# Patient Record
Sex: MALE | Race: WHITE | NOT HISPANIC OR LATINO | Employment: FULL TIME | URBAN - METROPOLITAN AREA
[De-identification: names, ages, dates, MRNs, and addresses within clinical notes are randomized per-mention and may not be internally consistent; named-entity substitution may affect disease eponyms.]

---

## 2017-05-29 ENCOUNTER — APPOINTMENT (OUTPATIENT)
Dept: CT IMAGING | Facility: HOSPITAL | Age: 22
End: 2017-05-29

## 2017-05-29 ENCOUNTER — HOSPITAL ENCOUNTER (EMERGENCY)
Facility: HOSPITAL | Age: 22
Discharge: HOME | End: 2017-05-29
Attending: EMERGENCY MEDICINE | Admitting: EMERGENCY MEDICINE

## 2017-05-29 VITALS
WEIGHT: 165 LBS | RESPIRATION RATE: 16 BRPM | OXYGEN SATURATION: 98 % | SYSTOLIC BLOOD PRESSURE: 112 MMHG | BODY MASS INDEX: 25.9 KG/M2 | HEART RATE: 79 BPM | TEMPERATURE: 98.6 F | DIASTOLIC BLOOD PRESSURE: 68 MMHG | HEIGHT: 67 IN

## 2017-05-29 DIAGNOSIS — R10.30 LOWER ABDOMINAL PAIN: Primary | ICD-10-CM

## 2017-05-29 LAB
ALBUMIN SERPL-MCNC: 4.5 G/DL (ref 3.2–4.8)
ALBUMIN/GLOB SERPL: 1.6 G/DL (ref 1.5–2.5)
ALP SERPL-CCNC: 58 U/L (ref 25–100)
ALT SERPL W P-5'-P-CCNC: 17 U/L (ref 7–40)
ANION GAP SERPL CALCULATED.3IONS-SCNC: 6 MMOL/L (ref 3–11)
AST SERPL-CCNC: 21 U/L (ref 0–33)
BASOPHILS # BLD AUTO: 0.01 10*3/MM3 (ref 0–0.2)
BASOPHILS NFR BLD AUTO: 0.2 % (ref 0–1)
BILIRUB SERPL-MCNC: 0.9 MG/DL (ref 0.3–1.2)
BUN BLD-MCNC: 13 MG/DL (ref 9–23)
BUN/CREAT SERPL: 11.8 (ref 7–25)
CALCIUM SPEC-SCNC: 10.2 MG/DL (ref 8.7–10.4)
CHLORIDE SERPL-SCNC: 102 MMOL/L (ref 99–109)
CO2 SERPL-SCNC: 30 MMOL/L (ref 20–31)
CREAT BLD-MCNC: 1.1 MG/DL (ref 0.6–1.3)
DEPRECATED RDW RBC AUTO: 41.9 FL (ref 37–54)
EOSINOPHIL # BLD AUTO: 0.07 10*3/MM3 (ref 0.1–0.3)
EOSINOPHIL NFR BLD AUTO: 1.3 % (ref 0–3)
ERYTHROCYTE [DISTWIDTH] IN BLOOD BY AUTOMATED COUNT: 12.6 % (ref 11.3–14.5)
GFR SERPL CREATININE-BSD FRML MDRD: 84 ML/MIN/1.73
GLOBULIN UR ELPH-MCNC: 2.9 GM/DL
GLUCOSE BLD-MCNC: 107 MG/DL (ref 70–100)
HCT VFR BLD AUTO: 46.1 % (ref 38.9–50.9)
HGB BLD-MCNC: 15.7 G/DL (ref 13.1–17.5)
HOLD SPECIMEN: NORMAL
HOLD SPECIMEN: NORMAL
IMM GRANULOCYTES # BLD: 0 10*3/MM3 (ref 0–0.03)
IMM GRANULOCYTES NFR BLD: 0 % (ref 0–0.6)
LIPASE SERPL-CCNC: 26 U/L (ref 6–51)
LYMPHOCYTES # BLD AUTO: 0.87 10*3/MM3 (ref 0.6–4.8)
LYMPHOCYTES NFR BLD AUTO: 15.9 % (ref 24–44)
MCH RBC QN AUTO: 30.7 PG (ref 27–31)
MCHC RBC AUTO-ENTMCNC: 34.1 G/DL (ref 32–36)
MCV RBC AUTO: 90 FL (ref 80–99)
MONOCYTES # BLD AUTO: 0.66 10*3/MM3 (ref 0–1)
MONOCYTES NFR BLD AUTO: 12 % (ref 0–12)
NEUTROPHILS # BLD AUTO: 3.87 10*3/MM3 (ref 1.5–8.3)
NEUTROPHILS NFR BLD AUTO: 70.6 % (ref 41–71)
PLATELET # BLD AUTO: 127 10*3/MM3 (ref 150–450)
PMV BLD AUTO: 12.2 FL (ref 6–12)
POTASSIUM BLD-SCNC: 3.7 MMOL/L (ref 3.5–5.5)
PROT SERPL-MCNC: 7.4 G/DL (ref 5.7–8.2)
RBC # BLD AUTO: 5.12 10*6/MM3 (ref 4.2–5.76)
SODIUM BLD-SCNC: 138 MMOL/L (ref 132–146)
WBC NRBC COR # BLD: 5.48 10*3/MM3 (ref 3.5–10.8)
WHOLE BLOOD HOLD SPECIMEN: NORMAL
WHOLE BLOOD HOLD SPECIMEN: NORMAL

## 2017-05-29 PROCEDURE — 96374 THER/PROPH/DIAG INJ IV PUSH: CPT

## 2017-05-29 PROCEDURE — 96375 TX/PRO/DX INJ NEW DRUG ADDON: CPT

## 2017-05-29 PROCEDURE — 96376 TX/PRO/DX INJ SAME DRUG ADON: CPT

## 2017-05-29 PROCEDURE — 25010000002 ONDANSETRON PER 1 MG: Performed by: EMERGENCY MEDICINE

## 2017-05-29 PROCEDURE — 83690 ASSAY OF LIPASE: CPT | Performed by: EMERGENCY MEDICINE

## 2017-05-29 PROCEDURE — 74177 CT ABD & PELVIS W/CONTRAST: CPT

## 2017-05-29 PROCEDURE — 99283 EMERGENCY DEPT VISIT LOW MDM: CPT

## 2017-05-29 PROCEDURE — 0 IOPAMIDOL 61 % SOLUTION: Performed by: EMERGENCY MEDICINE

## 2017-05-29 PROCEDURE — 25010000002 HYDROMORPHONE PER 4 MG: Performed by: EMERGENCY MEDICINE

## 2017-05-29 PROCEDURE — 85025 COMPLETE CBC W/AUTO DIFF WBC: CPT | Performed by: EMERGENCY MEDICINE

## 2017-05-29 PROCEDURE — 80053 COMPREHEN METABOLIC PANEL: CPT | Performed by: EMERGENCY MEDICINE

## 2017-05-29 RX ORDER — ONDANSETRON 2 MG/ML
4 INJECTION INTRAMUSCULAR; INTRAVENOUS ONCE
Status: COMPLETED | OUTPATIENT
Start: 2017-05-29 | End: 2017-05-29

## 2017-05-29 RX ORDER — ONDANSETRON HYDROCHLORIDE 8 MG/1
8 TABLET, FILM COATED ORAL EVERY 8 HOURS PRN
Qty: 6 TABLET | Refills: 0 | Status: SHIPPED | OUTPATIENT
Start: 2017-05-29 | End: 2017-06-19

## 2017-05-29 RX ORDER — SODIUM CHLORIDE 0.9 % (FLUSH) 0.9 %
10 SYRINGE (ML) INJECTION AS NEEDED
Status: DISCONTINUED | OUTPATIENT
Start: 2017-05-29 | End: 2017-05-29 | Stop reason: HOSPADM

## 2017-05-29 RX ORDER — HYDROCODONE BITARTRATE AND ACETAMINOPHEN 5; 325 MG/1; MG/1
1 TABLET ORAL EVERY 4 HOURS PRN
Qty: 10 TABLET | Refills: 0 | Status: SHIPPED | OUTPATIENT
Start: 2017-05-29 | End: 2017-06-19

## 2017-05-29 RX ADMIN — ONDANSETRON 4 MG: 2 INJECTION INTRAMUSCULAR; INTRAVENOUS at 11:43

## 2017-05-29 RX ADMIN — HYDROMORPHONE HYDROCHLORIDE 1 MG: 1 INJECTION, SOLUTION INTRAMUSCULAR; INTRAVENOUS; SUBCUTANEOUS at 12:46

## 2017-05-29 RX ADMIN — HYDROMORPHONE HYDROCHLORIDE 1 MG: 1 INJECTION, SOLUTION INTRAMUSCULAR; INTRAVENOUS; SUBCUTANEOUS at 11:42

## 2017-05-29 RX ADMIN — IOPAMIDOL 100 ML: 612 INJECTION, SOLUTION INTRAVENOUS at 12:07

## 2017-06-19 ENCOUNTER — OFFICE VISIT (OUTPATIENT)
Dept: INTERNAL MEDICINE | Facility: CLINIC | Age: 22
End: 2017-06-19

## 2017-06-19 VITALS
HEART RATE: 66 BPM | BODY MASS INDEX: 26.08 KG/M2 | RESPIRATION RATE: 14 BRPM | WEIGHT: 166.19 LBS | OXYGEN SATURATION: 98 % | DIASTOLIC BLOOD PRESSURE: 74 MMHG | SYSTOLIC BLOOD PRESSURE: 114 MMHG | TEMPERATURE: 97.9 F | HEIGHT: 67 IN

## 2017-06-19 DIAGNOSIS — T15.91XA EYE FOREIGN BODY, RIGHT, INITIAL ENCOUNTER: Primary | ICD-10-CM

## 2017-06-19 PROCEDURE — 99213 OFFICE O/P EST LOW 20 MIN: CPT | Performed by: NURSE PRACTITIONER

## 2017-06-19 RX ORDER — FLUTICASONE PROPIONATE 50 MCG
SPRAY, SUSPENSION (ML) NASAL 2 TIMES DAILY
COMMUNITY
Start: 2016-03-02 | End: 2020-01-16

## 2017-06-19 NOTE — PROGRESS NOTES
Chief Complaint / Reason:      Chief Complaint   Patient presents with   • Eye Problem     feels like something is in his right eye   • Ear Problem     would like right ear checked, just had a lot of wax come out.       Subjective   Patient presents today for eye and ear problem. He states that his right eye he feels like it has something in it. Denies any vision changes but his eye is very red from him rubbing his eyes. He states that he would like to have his right ear checked also because he has had a lot of wax come out of it. Denies any problems with hearing. No fever.    Foreign Body in Eye   The incident occurred 12 to 24 hours ago. The foreign body is unknown. Intake: patient states that he was just walking outside and felt like he got something in it .        History taken from: patient    PMH/FH/Social History were reviewed and updated appropriately in the electronic medical record.     Review of Systems:   Review of Systems  All other systems were reviewed and are negative.  Exceptions are noted in the subjective or above.      Objective     Vital Signs  Vitals:    06/19/17 0932   BP: 114/74   Pulse: 66   Resp: 14   Temp: 97.9 °F (36.6 °C)   SpO2: 98%       Body mass index is 26.03 kg/(m^2).    Physical Exam   Constitutional: He is oriented to person, place, and time. He appears well-developed and well-nourished.   Eyes: EOM are normal. Pupils are equal, round, and reactive to light. Foreign body present in the right eye. Right conjunctiva has a hemorrhage.       Attempted to remove foreign body with cotton tip and irrigation unsuccessful attempts x 3, referral order placed for opthalmology. Fluorescein solution used and 1 drop of Ciprodex administered in right eye.    Cardiovascular: Normal rate, regular rhythm, normal heart sounds and intact distal pulses.    Pulmonary/Chest: Effort normal and breath sounds normal.   Neurological: He is alert and oriented to person, place, and time.   Skin: Skin is  warm and dry.   Nursing note and vitals reviewed.      Medication Review:     Current Outpatient Prescriptions:   •  fluticasone (FLONASE) 50 MCG/ACT nasal spray, into each nostril 2 (Two) Times a Day., Disp: , Rfl:     Assessment/Plan   Sumit was seen today for eye problem and ear problem.    Diagnoses and all orders for this visit:    Eye foreign body, right, initial encounter  -     Ambulatory Referral to Ophthalmology  Recommend good handwashing and not rubbing eyes.   Discussed worsening signs and symptoms.   May take Motrin or Tylenol for pain or discomfort and may use cool cloth to decrease swelling around area.   Follow up RICKY Oates, NERI  06/19/2017

## 2017-06-19 NOTE — PATIENT INSTRUCTIONS
Eye Foreign Body  A foreign body is an object on or in the eye that should not be there. The object could be a speck of dirt or dust, a hair, an eyelash, a splinter, or any other object.  HOME CARE  · Take medicines only as told by your doctor. Use eye drops or ointment as told.  · If no eye patch was put on:  ¨ Keep the eye closed as much as possible.  ¨ Do not rub the eye.  ¨ Wear dark glasses in bright light.  ¨ Do not wear contact lenses until the eye feels normal, or as told by your doctor.  ¨ Wear protective eye covering when needed, especially when using high-speed tools.  · If your eye is patched:  ¨ Follow your doctor's instructions for when to remove the patch.  ¨ Do not drive or use machines while the eye patch is on. Judging distances is hard to do while wearing a patch.  · Keep all follow-up visits as told by your doctor. This is important.  GET HELP IF:   · Your pain gets worse.  · Your vision gets worse.  · You have problems with your eye patch.  · You have fluid (discharge) coming from your eye.  · You have redness and swelling around your eye.  MAKE SURE YOU:   · Understand these instructions.  · Will watch your condition.  · Will get help right away if you are not doing well or get worse.     This information is not intended to replace advice given to you by your health care provider. Make sure you discuss any questions you have with your health care provider.     Document Released: 06/07/2011 Document Revised: 01/08/2016 Document Reviewed: 05/15/2014  [employer] Patient Education ©2017 [employer]    Eye Foreign Body  A foreign body is an object on or in the eye that should not be there. The object could be a speck of dirt or dust, a hair, an eyelash, a splinter, or any other object.  HOME CARE  · Take medicines only as told by your doctor. Use eye drops or ointment as told.  · If no eye patch was put on:  ¨ Keep the eye closed as much as possible.  ¨ Do not rub the eye.  ¨ Wear dark  glasses in bright light.  ¨ Do not wear contact lenses until the eye feels normal, or as told by your doctor.  ¨ Wear protective eye covering when needed, especially when using high-speed tools.  · If your eye is patched:  ¨ Follow your doctor's instructions for when to remove the patch.  ¨ Do not drive or use machines while the eye patch is on. Judging distances is hard to do while wearing a patch.  · Keep all follow-up visits as told by your doctor. This is important.  GET HELP IF:   · Your pain gets worse.  · Your vision gets worse.  · You have problems with your eye patch.  · You have fluid (discharge) coming from your eye.  · You have redness and swelling around your eye.  MAKE SURE YOU:   · Understand these instructions.  · Will watch your condition.  · Will get help right away if you are not doing well or get worse.     This information is not intended to replace advice given to you by your health care provider. Make sure you discuss any questions you have with your health care provider.     Document Released: 06/07/2011 Document Revised: 01/08/2016 Document Reviewed: 05/15/2014  [employer] Interactive Patient Education ©2017 [employer]    Eye Foreign Body  A foreign body is an object on or in the eye that should not be there. The object could be a speck of dirt or dust, a hair, an eyelash, a splinter, or any other object.  HOME CARE  · Take medicines only as told by your doctor. Use eye drops or ointment as told.  · If no eye patch was put on:  ¨ Keep the eye closed as much as possible.  ¨ Do not rub the eye.  ¨ Wear dark glasses in bright light.  ¨ Do not wear contact lenses until the eye feels normal, or as told by your doctor.  ¨ Wear protective eye covering when needed, especially when using high-speed tools.  · If your eye is patched:  ¨ Follow your doctor's instructions for when to remove the patch.  ¨ Do not drive or use machines while the eye patch is on. Judging distances is hard to do while  wearing a patch.  · Keep all follow-up visits as told by your doctor. This is important.  GET HELP IF:   · Your pain gets worse.  · Your vision gets worse.  · You have problems with your eye patch.  · You have fluid (discharge) coming from your eye.  · You have redness and swelling around your eye.  MAKE SURE YOU:   · Understand these instructions.  · Will watch your condition.  · Will get help right away if you are not doing well or get worse.     This information is not intended to replace advice given to you by your health care provider. Make sure you discuss any questions you have with your health care provider.     Document Released: 06/07/2011 Document Revised: 01/08/2016 Document Reviewed: 05/15/2014  [employer] Interactive Patient Education ©2017 [employer]

## 2017-11-24 ENCOUNTER — HOSPITAL ENCOUNTER (EMERGENCY)
Facility: HOSPITAL | Age: 22
Discharge: STILL ADMITTED OR ADMINISTRATIVE | End: 2017-11-24

## 2020-01-16 ENCOUNTER — OFFICE VISIT (OUTPATIENT)
Dept: INTERNAL MEDICINE | Facility: CLINIC | Age: 25
End: 2020-01-16

## 2020-01-16 VITALS
BODY MASS INDEX: 28.72 KG/M2 | TEMPERATURE: 97.6 F | SYSTOLIC BLOOD PRESSURE: 104 MMHG | HEART RATE: 77 BPM | DIASTOLIC BLOOD PRESSURE: 80 MMHG | WEIGHT: 183 LBS | OXYGEN SATURATION: 98 % | RESPIRATION RATE: 16 BRPM | HEIGHT: 67 IN

## 2020-01-16 DIAGNOSIS — F41.9 ANXIETY: ICD-10-CM

## 2020-01-16 DIAGNOSIS — R40.0 DAYTIME SLEEPINESS: ICD-10-CM

## 2020-01-16 DIAGNOSIS — E66.3 OVERWEIGHT: ICD-10-CM

## 2020-01-16 DIAGNOSIS — T78.40XA ALLERGIC STATE, INITIAL ENCOUNTER: ICD-10-CM

## 2020-01-16 DIAGNOSIS — R53.83 OTHER FATIGUE: Primary | ICD-10-CM

## 2020-01-16 DIAGNOSIS — R06.83 SNORES: ICD-10-CM

## 2020-01-16 PROCEDURE — 99214 OFFICE O/P EST MOD 30 MIN: CPT | Performed by: INTERNAL MEDICINE

## 2020-01-16 NOTE — PROGRESS NOTES
Subjective   Sumit Philippe is a 24 y.o. male.     Chief Complaint   Patient presents with   • Labs Only     Pt states that he would like for his TSH to be checked, pt has been c/o being very tired all the time.        History of Present Illness   C/o of fatigue no energy, daytime sleepy,1- 2 years.  snore loud, no depression and mild anxiety. Weight gain 20 Ib over 6 months. Positive running nose   No current outpatient medications on file.    The following portions of the patient's history were reviewed and updated as appropriate: allergies, current medications, past family history, past medical history, past social history, past surgical history and problem list.    Review of Systems   Constitutional: Positive for fatigue.   HENT: Positive for postnasal drip and rhinorrhea. Negative for congestion.    Respiratory: Negative.  Negative for shortness of breath.    Cardiovascular: Negative.  Negative for chest pain.   Gastrointestinal: Negative.    Musculoskeletal: Negative.  Negative for arthralgias and myalgias.   Skin: Negative.    Neurological: Negative.  Negative for dizziness.   Psychiatric/Behavioral: Negative.        Objective   Physical Exam   Constitutional: He is oriented to person, place, and time. He appears well-developed and well-nourished.   Neck: Neck supple.   Cardiovascular: Normal rate, regular rhythm and normal heart sounds.   Pulmonary/Chest: Effort normal and breath sounds normal.   Abdominal: Bowel sounds are normal.   Neurological: He is alert and oriented to person, place, and time.   Skin: Skin is warm.   Psychiatric: He has a normal mood and affect.       All tests have been reviewed.    Assessment/Plan   Diagnoses and all orders for this visit:    Other fatigue  -     CBC & Differential  -     Comprehensive Metabolic Panel  -     TSH  -     Vitamin B12  -     Vitamin D 25 Hydroxy  -     Folate  -     Iron Profile  -     Testosterone, Free, Total    Allergic state, initial  encounter allegra D start   -     CBC & Differential    Overweight good diet    Anxiety watch    Daytime sleepiness next    Snores next    Other orders  -     Discontinue: Multiple Vitamin (MULTI VITAMIN DAILY PO); Take  by mouth Daily.      10days after labs

## 2020-01-28 ENCOUNTER — TELEPHONE (OUTPATIENT)
Dept: INTERNAL MEDICINE | Facility: CLINIC | Age: 25
End: 2020-01-28

## 2020-01-28 NOTE — TELEPHONE ENCOUNTER
Patient requesting a call back to discuss the results of his recent labs. Patient stated he is unable to be seen at this time and would prefer it if Dr. Montes De Oca could call and discuss the results.   Patient’s call back number is: [phone]   Patient stated he does not have access to his phone until 5pm so it is okay to [facility].

## 2020-01-29 DIAGNOSIS — R79.0 ABNORMAL IRON SATURATION: Primary | ICD-10-CM

## 2020-01-29 NOTE — TELEPHONE ENCOUNTER
Called to check on the status of the testosterone, TSH, Vit D labs and the lady I spoke with at [facility] stated they were all done and available to fax over. Gave her my fax # and waiting for the results.

## 2020-01-29 NOTE — TELEPHONE ENCOUNTER
Called and left  for pt stating that all his labs looked good but his Iron was a little elevated. Advised pt Dr. Montes De Oca put in a new order for him to get drawn for his iron and he wants him to check it in 3 months. Left our office call back # if pt had any questions.

## 2020-01-29 NOTE — TELEPHONE ENCOUNTER
I have pt's labs, I laid them on your desk for you to go over and call pt since he would like to discuss these with you.

## 2020-01-29 NOTE — TELEPHONE ENCOUNTER
Drink plenty of fluids   Please let patient know available labs showed iron slightly elevated.  We are still waiting for the other labs.  We do need to repeat iron in 3 months